# Patient Record
Sex: MALE | Race: BLACK OR AFRICAN AMERICAN | NOT HISPANIC OR LATINO | Employment: FULL TIME | URBAN - METROPOLITAN AREA
[De-identification: names, ages, dates, MRNs, and addresses within clinical notes are randomized per-mention and may not be internally consistent; named-entity substitution may affect disease eponyms.]

---

## 2017-05-11 ENCOUNTER — OFFICE VISIT (OUTPATIENT)
Dept: MEDICAL GROUP | Facility: PHYSICIAN GROUP | Age: 37
End: 2017-05-11
Payer: COMMERCIAL

## 2017-05-11 VITALS
OXYGEN SATURATION: 100 % | HEART RATE: 66 BPM | RESPIRATION RATE: 14 BRPM | HEIGHT: 74 IN | SYSTOLIC BLOOD PRESSURE: 118 MMHG | WEIGHT: 235 LBS | BODY MASS INDEX: 30.16 KG/M2 | TEMPERATURE: 97.3 F | DIASTOLIC BLOOD PRESSURE: 88 MMHG

## 2017-05-11 DIAGNOSIS — R00.2 HEART PALPITATIONS: ICD-10-CM

## 2017-05-11 DIAGNOSIS — F10.10 ALCOHOL ABUSE: ICD-10-CM

## 2017-05-11 DIAGNOSIS — E66.9 OBESITY (BMI 30-39.9): ICD-10-CM

## 2017-05-11 DIAGNOSIS — R53.83 FATIGUE, UNSPECIFIED TYPE: ICD-10-CM

## 2017-05-11 DIAGNOSIS — F43.9 STRESS: ICD-10-CM

## 2017-05-11 PROCEDURE — 99214 OFFICE O/P EST MOD 30 MIN: CPT | Performed by: NURSE PRACTITIONER

## 2017-05-11 ASSESSMENT — PATIENT HEALTH QUESTIONNAIRE - PHQ9: CLINICAL INTERPRETATION OF PHQ2 SCORE: 0

## 2017-05-11 NOTE — MR AVS SNAPSHOT
"        Severino Alva Carrier   2017 8:40 AM   Office Visit   MRN: [id number]    Department:  [facility]   Dept Phone:  [phone]    Description:  Male : 1980   Provider:  ELISE Junior           Reason for Visit     Establish Care     Orders Needed labs       Allergies as of 2017     No Known Allergies      You were diagnosed with     Heart palpitations   [978137]       Obesity (BMI 30-39.9)   [858573]       Stress   [879640]       Fatigue, unspecified type   [4774729]         Vital Signs     Blood Pressure Pulse Temperature Respirations Height Weight    118/88 mmHg 66 36.3 °C (97.3 °F) 14 1.88 m (6' 2\") 106.595 kg (235 lb)    Body Mass Index Oxygen Saturation Smoking Status             30.16 kg/m2 100% Never Smoker          Basic Information     Date Of Birth Sex Race Ethnicity Preferred Language    1980 Male Black or  Non- English      Your appointments     2017  8:00 AM   Established Patient with ELISE Junior   [facility]    [address] [place] [id number] [phone]           You will be receiving a confirmation call a few days before your appointment from our automated call confirmation system.              Problem List              ICD-10-CM Priority Class Noted - Resolved    Heart palpitations R00.2   2017 - Present    Obesity (BMI 30-39.9) E66.9   2017 - Present    Stress F43.9   2017 - Present      Health Maintenance        Date Due Completion Dates    IMM DTaP/Tdap/Td Vaccine (1 - Tdap) 1998 ---            Current Immunizations     No immunizations on file.      Below and/or attached are the medications your provider expects you to take. Review all of your home medications and newly ordered medications with your provider and/or pharmacist. Follow medication instructions as directed by your provider and/or pharmacist. Please keep your medication list with you and share " with your provider. Update the information when medications are discontinued, doses are changed, or new medications (including over-the-counter products) are added; and carry medication information at all times in the event of emergency situations     Allergies:  No Known Allergies          Medications  Valid as of: May 11, 2017 -  9:22 AM    Generic Name Brand Name Tablet Size Instructions for use    Sulfamethoxazole-Trimethoprim (Tab) BACTRIM -160 MG Take 1 Tab by mouth 2 times a day.        .                 Medicines prescribed today were sent to:     [facility] #[id number] - [place], [place] - [address] [place] [address]    Phone: [phone] Fax: [phone]    Open 24 Hours?: No      Medication refill instructions:       If your prescription bottle indicates you have medication refills left, it is not necessary to call your provider’s office. Please contact your pharmacy and they will refill your medication.    If your prescription bottle indicates you do not have any refills left, you may request refills at any time through one of the following ways: The online [employer] system (except Urgent Care), by calling your provider’s office, or by asking your pharmacy to contact your provider’s office with a refill request. Medication refills are processed only during regular business hours and may not be available until the next business day. Your provider may request additional information or to have a follow-up visit with you prior to refilling your medication.   *Please Note: Medication refills are assigned a new Rx number when refilled electronically. Your pharmacy may indicate that no refills were authorized even though a new prescription for the same medication is available at the pharmacy. Please request the medicine by name with the pharmacy before contacting your provider for a refill.        Your To Do List     Future Labs/Procedures Complete By  Expires    CBC WITH DIFFERENTIAL  As directed 5/12/2018    COMP METABOLIC PANEL  As directed 5/12/2018    LIPID PROFILE  As directed 5/12/2018    TESTOSTERONE SERUM  As directed 5/11/2018    TSH  As directed 5/12/2018    VITAMIN D,25 HYDROXY  As directed 5/12/2018      Referral     A referral request has been sent to our patient care coordination department. Please allow 3-5 business days for us to process this request and contact you either by phone or mail. If you do not hear from us by the 5th business day, please call us at [phone].           [employer] Access Code: [email or web address]  Expires: 6/10/2017  9:22 AM    [employer]  A secure, online tool to manage your health information     [employer]’s [employer]® is a secure, online tool that connects you to your personalized health information from the privacy of your home -- day or night - making it very easy for you to manage your healthcare. Once the activation process is completed, you can even access your medical information using the [employer] kayy, which is available for free in the Apple Kayy store or Google Play store.     [employer] provides the following levels of access (as shown below):   My Chart Features   [facility] Doctor [facility]  Specialists [facility]  Urgent  Care Non-[facility]  Primary Care  Doctor   Email your healthcare team securely and privately 24/7 X X X    Manage appointments: schedule your next appointment; view details of past/upcoming appointments X      Request prescription refills. X      View recent personal medical records, including lab and immunizations X X X X   View health record, including health history, allergies, medications X X X X   Read reports about your outpatient visits, procedures, consult and ER notes X X X X   See your discharge summary, which is a recap of your hospital and/or ER visit that includes your diagnosis, lab results, and care plan. X X       How to register for [employer]:  1. Go to   [email or web address].  2. Click on the Sign Up Now box, which takes you to the New Member Sign Up page. You will need to provide the following information:  a. Enter your [employer] Access Code exactly as it appears at the top of this page. (You will not need to use this code after you’ve completed the sign-up process. If you do not sign up before the expiration date, you must request a new code.)   b. Enter your date of birth.   c. Enter your home email address.   d. Click Submit, and follow the next screen’s instructions.  3. Create a [employer] ID. This will be your [employer] login ID and cannot be changed, so think of one that is secure and easy to remember.  4. Create a [employer] password. You can change your password at any time.  5. Enter your Password Reset Question and Answer. This can be used at a later time if you forget your password.   6. Enter your e-mail address. This allows you to receive e-mail notifications when new information is available in [employer].  7. Click Sign Up. You can now view your health information.    For assistance activating your [employer] account, call [phone]

## 2017-05-11 NOTE — ASSESSMENT & PLAN NOTE
Patient states that he has occasionaly palpitations with chest heaviness that started several years ago.  Had cardiac ultrasound that was normal a few months ago.

## 2017-05-11 NOTE — ASSESSMENT & PLAN NOTE
Patient states that he has increased his alcohol intake to almost a half a pint every night. And has not going to the gym as often as he had in the past and so he has gained some weight that he is concerned about.

## 2017-05-11 NOTE — PROGRESS NOTES
"Chief Complaint   Patient presents with   • Establish Care   • Orders Needed     labs        Subjective:   Severino Alva Carrier is a 37 y.o. -American male here today to establish care and for for evaluation and management of:    Heart palpitations  Patient states that he has occasionaly palpitations with chest heaviness that started several years ago.  Had cardiac ultrasound that was normal a few months ago.    Alcohol abuse  Patient states that over the past month his alcohol intake has increased dramatically. He attributes this to stress at home with his marriage. He and his wife had been  9 years and they have 3 daughters together. He states that he is drinking 1/2 pint nightly of vodka. He is not going to the gym as frequently as he had in the past and is concerned. He denies any suicidal ideations at this time.    Obesity (BMI 30-39.9)  Patient states that he has increased his alcohol intake to almost a half a pint every night. And has not going to the gym as often as he had in the past and so he has gained some weight that he is concerned about.    Stress  Patient states that his job is enjoyable and he does not feel job stress however he states that he is having marital stress.         Current medicines (including changes today)  Current Outpatient Prescriptions   Medication Sig Dispense Refill   • sulfamethoxazole-trimethoprim (BACTRIM DS) 800-160 MG tablet Take 1 Tab by mouth 2 times a day. 20 Tab 0     No current facility-administered medications for this visit.     He  has no past medical history on file.    ROS as stated in history of present illness.  No chest pain, no shortness of breath, no abdominal pain       Objective:     Blood pressure 118/88, pulse 66, temperature 36.3 °C (97.3 °F), resp. rate 14, height 1.88 m (6' 2\"), weight 106.595 kg (235 lb), SpO2 100 %. Body mass index is 30.16 kg/(m^2). up 10 pounds from last year.  Physical Exam:  Constitutional: Alert, no " distress.  Skin: Warm, dry, good turgor,no cyanosis, no rashes in visible areas.  Eye: Equal, round and reactive, conjunctiva clear, lids normal.  Ears: No tenderness, no discharge.  External canals are without any significant edema or erythema.  Tympanic membranes are without any inflammation, no effusion.  Gross auditory acuity is intact.  Nose: symmetrical without tenderness, no discharge.  Mouth/Throat: lips without lesion.  Oropharynx clear.  Throat without erythema, exudates or tonsillar enlargement.  Neck: Trachea midline, no masses, no obvious thyroid enlargement.. No cervical or supraclavicular lymphadenopathy. Range of motion within normal limits.  Neuro: Cranial nerves 2-12 grossly intact.  No sensory deficit.  Respiratory: Unlabored respiratory effort, lungs clear to auscultation, no wheezes, no ronchi.  Cardiovascular: Normal S1, S2, no murmur, no edema.  Abdomen: Soft, non-tender, no masses, no guarding,  no hepatosplenomegaly.  Psych: Alert and oriented x3, normal affect and mood and judgement.        Assessment and Plan:   The following treatment plan was discussed    1. Heart palpitations  This is a new problem to me. Chronic. Ongoing. Patient was seen at an urgent care and a cardiac ultrasound was ordered which he obtained this was all normal. Patient did not follow through with any lab work at that time and is requesting to do that at this time. I truly believe that with a normal cardiac ultrasound that his palpitations are stress related. We will monitor and follow results. ED precautions given and patient verbalizes understanding. Patient is to return in 6 weeks for follow-up.  - COMP METABOLIC PANEL; Future  - LIPID PROFILE; Future  - CBC WITH DIFFERENTIAL; Future    2. Obesity (BMI 30-39.9)  This is a new problem to me. Acute. Unstable. Patient's alcohol intake has increased and exercise has decreased. We discussed at length diet and exercise and decreasing alcohol.    3. Stress  This is a new  problem to me. Acute. Unstable. Referral placed to behavioral health. Patient's alcohol intake has increased and he is having marital problems. He should return in 6 weeks for follow-up.  - Patient identified as having weight management issue.  Appropriate orders and counseling given.  - REFERRAL TO BEHAVIORAL HEALTH    4. Fatigue, unspecified type  This is a new problem to me. Chronic. Ongoing. We will check labs to rule out metabolic issues. I feel like this is mostly related to the stresses that he is experiencing along with his alcohol intake however we will check for any metabolic causes. Monitor results. Patient to follow-up in 6 weeks.  - TSH; Future  - VITAMIN D,25 HYDROXY; Future  - TESTOSTERONE SERUM; Future    5. Alcohol abuse  This is a new problem to me. Acute. Unstable. Discussed at length with patient his concerns. Referral to behavioral health placed. Patient is concerned about his intake and wants to decrease this. He is cycle of going to the gym after work before going home to help him relieve stress and decrease alcohol. Monitor and follow. Patient to follow-up in 6 weeks.      Followup: Return in about 2 months (around 7/11/2017) for stress I.

## 2017-05-11 NOTE — ASSESSMENT & PLAN NOTE
Patient states that over the past month his alcohol intake has increased dramatically. He attributes this to stress at home with his marriage. He and his wife had been  9 years and they have 3 daughters together. He states that he is drinking 1/2 pint nightly of vodka. He is not going to the gym as frequently as he had in the past and is concerned. He denies any suicidal ideations at this time.

## 2017-05-11 NOTE — ASSESSMENT & PLAN NOTE
Patient states that his job is enjoyable and he does not feel job stress however he states that he is having marital stress.

## 2017-05-15 ENCOUNTER — HOSPITAL ENCOUNTER (OUTPATIENT)
Dept: LAB | Facility: MEDICAL CENTER | Age: 37
End: 2017-05-15
Attending: NURSE PRACTITIONER
Payer: COMMERCIAL

## 2017-05-15 DIAGNOSIS — R53.82 CHRONIC FATIGUE: ICD-10-CM

## 2017-05-15 DIAGNOSIS — R53.83 FATIGUE, UNSPECIFIED TYPE: ICD-10-CM

## 2017-05-15 DIAGNOSIS — R00.2 HEART PALPITATIONS: ICD-10-CM

## 2017-05-15 LAB
25(OH)D3 SERPL-MCNC: 12 NG/ML (ref 30–100)
ALBUMIN SERPL BCP-MCNC: 4.5 G/DL (ref 3.2–4.9)
ALBUMIN/GLOB SERPL: 1.4 G/DL
ALP SERPL-CCNC: 34 U/L (ref 30–99)
ALT SERPL-CCNC: 66 U/L (ref 2–50)
ANION GAP SERPL CALC-SCNC: 7 MMOL/L (ref 0–11.9)
AST SERPL-CCNC: 53 U/L (ref 12–45)
BASOPHILS # BLD AUTO: 0.5 % (ref 0–1.8)
BASOPHILS # BLD: 0.02 K/UL (ref 0–0.12)
BILIRUB SERPL-MCNC: 0.5 MG/DL (ref 0.1–1.5)
BUN SERPL-MCNC: 16 MG/DL (ref 8–22)
CALCIUM SERPL-MCNC: 9.4 MG/DL (ref 8.5–10.5)
CHLORIDE SERPL-SCNC: 106 MMOL/L (ref 96–112)
CHOLEST SERPL-MCNC: 166 MG/DL (ref 100–199)
CO2 SERPL-SCNC: 25 MMOL/L (ref 20–33)
CREAT SERPL-MCNC: 1.12 MG/DL (ref 0.5–1.4)
EOSINOPHIL # BLD AUTO: 0.03 K/UL (ref 0–0.51)
EOSINOPHIL NFR BLD: 0.8 % (ref 0–6.9)
ERYTHROCYTE [DISTWIDTH] IN BLOOD BY AUTOMATED COUNT: 45.1 FL (ref 35.9–50)
GFR SERPL CREATININE-BSD FRML MDRD: >60 ML/MIN/1.73 M 2
GLOBULIN SER CALC-MCNC: 3.2 G/DL (ref 1.9–3.5)
GLUCOSE SERPL-MCNC: 89 MG/DL (ref 65–99)
HCT VFR BLD AUTO: 46.4 % (ref 42–52)
HDLC SERPL-MCNC: 53 MG/DL
HGB BLD-MCNC: 15.4 G/DL (ref 14–18)
IMM GRANULOCYTES # BLD AUTO: 0 K/UL (ref 0–0.11)
IMM GRANULOCYTES NFR BLD AUTO: 0 % (ref 0–0.9)
LDLC SERPL CALC-MCNC: 99 MG/DL
LYMPHOCYTES # BLD AUTO: 2.11 K/UL (ref 1–4.8)
LYMPHOCYTES NFR BLD: 57.7 % (ref 22–41)
MCH RBC QN AUTO: 30.9 PG (ref 27–33)
MCHC RBC AUTO-ENTMCNC: 33.2 G/DL (ref 33.7–35.3)
MCV RBC AUTO: 93 FL (ref 81.4–97.8)
MONOCYTES # BLD AUTO: 0.32 K/UL (ref 0–0.85)
MONOCYTES NFR BLD AUTO: 8.7 % (ref 0–13.4)
NEUTROPHILS # BLD AUTO: 1.18 K/UL (ref 1.82–7.42)
NEUTROPHILS NFR BLD: 32.3 % (ref 44–72)
NRBC # BLD AUTO: 0 K/UL
NRBC BLD AUTO-RTO: 0 /100 WBC
PLATELET # BLD AUTO: 187 K/UL (ref 164–446)
PMV BLD AUTO: 11.5 FL (ref 9–12.9)
POTASSIUM SERPL-SCNC: 4.3 MMOL/L (ref 3.6–5.5)
PROT SERPL-MCNC: 7.7 G/DL (ref 6–8.2)
RBC # BLD AUTO: 4.99 M/UL (ref 4.7–6.1)
SODIUM SERPL-SCNC: 138 MMOL/L (ref 135–145)
TESTOST SERPL-MCNC: 246 NG/DL (ref 175–781)
TRIGL SERPL-MCNC: 72 MG/DL (ref 0–149)
TSH SERPL DL<=0.005 MIU/L-ACNC: 2.32 UIU/ML (ref 0.3–3.7)
WBC # BLD AUTO: 3.7 K/UL (ref 4.8–10.8)

## 2017-05-15 PROCEDURE — 85025 COMPLETE CBC W/AUTO DIFF WBC: CPT

## 2017-05-15 PROCEDURE — 84443 ASSAY THYROID STIM HORMONE: CPT

## 2017-05-15 PROCEDURE — 82306 VITAMIN D 25 HYDROXY: CPT

## 2017-05-15 PROCEDURE — 84403 ASSAY OF TOTAL TESTOSTERONE: CPT

## 2017-05-15 PROCEDURE — 80053 COMPREHEN METABOLIC PANEL: CPT

## 2017-05-15 PROCEDURE — 80061 LIPID PANEL: CPT

## 2017-05-15 PROCEDURE — 36415 COLL VENOUS BLD VENIPUNCTURE: CPT

## 2017-05-16 ENCOUNTER — TELEPHONE (OUTPATIENT)
Dept: MEDICAL GROUP | Facility: PHYSICIAN GROUP | Age: 37
End: 2017-05-16

## 2017-05-16 NOTE — TELEPHONE ENCOUNTER
----- Message from ELISE Junior sent at 5/15/2017  4:31 PM PDT -----  Please let Prince know that his labs are back.  Kidney, thyroid, cholesterol and triglyceride levels normal.  Testosterone was in the normal range, but as we discussed, we should get a second draw.  I will put in the order and you can come to the lab at your convenience (at least a week apart from the first draw) and we will get a second test.  Your liver enzymes were a little elevated.  This could be from your alcohol usage putting pressure on your liver function.  Your vitamin D level came back low at 12.  The normal level is above 30.  Vitamin D is an important vitamin that helps with energy, bone strength and our immune system.  It also helps our body utilize glucose more efficiently.  I would like you to add 5000 units of vitamin D3 daily.  We will recheck your levels in 12 months. You can get Vitamin D3 over the counter at any drug store.  ELISE Junior

## 2018-05-05 ENCOUNTER — OFFICE VISIT (OUTPATIENT)
Dept: URGENT CARE | Facility: PHYSICIAN GROUP | Age: 38
End: 2018-05-05
Payer: COMMERCIAL

## 2018-05-05 VITALS
HEART RATE: 89 BPM | HEIGHT: 74 IN | RESPIRATION RATE: 14 BRPM | OXYGEN SATURATION: 98 % | DIASTOLIC BLOOD PRESSURE: 80 MMHG | TEMPERATURE: 97.6 F | WEIGHT: 225 LBS | BODY MASS INDEX: 28.88 KG/M2 | SYSTOLIC BLOOD PRESSURE: 120 MMHG

## 2018-05-05 DIAGNOSIS — S86.012A RUPTURE OF LEFT ACHILLES TENDON, INITIAL ENCOUNTER: ICD-10-CM

## 2018-05-05 PROCEDURE — 99214 OFFICE O/P EST MOD 30 MIN: CPT | Performed by: PHYSICIAN ASSISTANT

## 2018-05-05 RX ORDER — TRAMADOL HYDROCHLORIDE 50 MG/1
50 TABLET ORAL EVERY 4 HOURS PRN
Qty: 20 TAB | Refills: 0 | Status: SHIPPED | OUTPATIENT
Start: 2018-05-05 | End: 2018-05-12

## 2018-05-05 ASSESSMENT — ENCOUNTER SYMPTOMS
CHILLS: 0
TINGLING: 0
SHORTNESS OF BREATH: 0
INABILITY TO BEAR WEIGHT: 0
NAUSEA: 0
DIARRHEA: 0
VOMITING: 0
DIZZINESS: 0
NUMBNESS: 1
FEVER: 0
LOSS OF SENSATION: 0
MUSCLE WEAKNESS: 0
ABDOMINAL PAIN: 0

## 2018-05-05 NOTE — PROGRESS NOTES
"Subjective:      Severino Mcfarlane is a 38 y.o. male who presents with Pain (left calf pain since today )            Ankle Injury    The incident occurred 1 to 3 hours ago. The incident occurred at the gym. The pain is present in the right ankle. The quality of the pain is described as aching. The pain is at a severity of 6/10. The pain is moderate. The pain has been constant since onset. Associated symptoms include numbness. Pertinent negatives include no inability to bear weight, loss of sensation, muscle weakness or tingling. The symptoms are aggravated by palpation and movement. He has tried nothing for the symptoms.     Patient presents to urgent care reporting left ankle pain starting today while playing basketball. He reports feeling a \"pop\" in his left achilles with immediate sharp pain. He has had difficulty walking and moving the ankle since the incident. He also reports some numbness over the posterior ankle. No prior left ankle injuries. He has not taken anything for the pain.    Review of Systems   Constitutional: Negative for chills and fever.   HENT: Negative for congestion.    Respiratory: Negative for shortness of breath.    Cardiovascular: Negative for chest pain.   Gastrointestinal: Negative for abdominal pain, diarrhea, nausea and vomiting.   Genitourinary: Negative.    Musculoskeletal:        Positive for ankle pain   Neurological: Positive for numbness. Negative for dizziness and tingling.        Objective:     /80   Pulse 89   Temp 36.4 °C (97.6 °F)   Resp 14   Ht 1.88 m (6' 2\")   Wt 102.1 kg (225 lb)   SpO2 98%   BMI 28.89 kg/m²      Physical Exam   Constitutional: He is oriented to person, place, and time. He appears well-developed and well-nourished. No distress.   HENT:   Head: Normocephalic and atraumatic.   Eyes: Pupils are equal, round, and reactive to light.   Neck: Normal range of motion.   Cardiovascular: Normal rate.    Pulmonary/Chest: Effort normal. "   Musculoskeletal:        Left ankle: He exhibits decreased range of motion. He exhibits no swelling and no ecchymosis. Achilles tendon exhibits pain, defect and abnormal Finney's test results.   Tenderness over left posterior calf.    Neurological: He is alert and oriented to person, place, and time.   Skin: Skin is warm and dry. He is not diaphoretic.   Psychiatric: He has a normal mood and affect. His behavior is normal.   Nursing note and vitals reviewed.         PMH:  has no past medical history on file.  MEDS: No current outpatient prescriptions on file.  ALLERGIES: No Known Allergies  SURGHX:   Past Surgical History:   Procedure Laterality Date   • KNEE ARTHROSCOPY Left      SOCHX:  reports that he has never smoked. He has never used smokeless tobacco. He reports that he drinks about 2.4 oz of alcohol per week . He reports that he does not use drugs.  FH: family history includes Diabetes in his mother; Heart Disease in his mother; No Known Problems in his father and sister.       Assessment/Plan:     1. Rupture of left Achilles tendon, initial encounter  - REFERRAL TO SPORTS MEDICINE    Suspect achilles tendon rupture. Patient placed in posterior leg splint and given crutches. Advised no weight bearing until instructed otherwise. He will follow up with Dr. Gottlieb in 2 days (5/7) for further evaluation and discussion of surgical vs. non-surgical management. Encouraged icing and OTC nsiads as needed for pain. The patient demonstrated a good understanding and agreed with the treatment plan.

## 2018-05-08 ENCOUNTER — OFFICE VISIT (OUTPATIENT)
Dept: MEDICAL GROUP | Facility: CLINIC | Age: 38
End: 2018-05-08
Payer: COMMERCIAL

## 2018-05-08 VITALS
OXYGEN SATURATION: 98 % | RESPIRATION RATE: 18 BRPM | DIASTOLIC BLOOD PRESSURE: 82 MMHG | HEART RATE: 90 BPM | WEIGHT: 225 LBS | HEIGHT: 74 IN | SYSTOLIC BLOOD PRESSURE: 124 MMHG | TEMPERATURE: 98.2 F | BODY MASS INDEX: 28.88 KG/M2

## 2018-05-08 DIAGNOSIS — S86.012A RUPTURE OF LEFT ACHILLES TENDON, INITIAL ENCOUNTER: ICD-10-CM

## 2018-05-08 PROCEDURE — 99203 OFFICE O/P NEW LOW 30 MIN: CPT | Performed by: FAMILY MEDICINE

## 2018-05-08 ASSESSMENT — ENCOUNTER SYMPTOMS
VOMITING: 0
HEADACHES: 0
CHILLS: 0
DIZZINESS: 0
FEVER: 0
PALPITATIONS: 0
SHORTNESS OF BREATH: 0
NAUSEA: 0

## 2018-05-08 NOTE — PROGRESS NOTES
"Subjective:      Severino Mcfarlane is a 38 y.o. male who presents with Leg Pain (Referral from / L calf injury )       Referred by Saundra Bustamante PA-C  for evaluation of LEFT ankle pain    HPI   LEFT ankle pain  Date of injury May 5, 2018  Mechanism of injury, heard a pop or playing basketball  Was able to bear weight shortly after the incident, limping  Mostly pain in the calf region of the LEFT leg  No pain has focused more down towards the posterior heel region  Burning, occasionally sharp at the end of the day  No radiation  Taking ibuprofen for pain which is helping some  POSITIVE swelling  No prior issues with the ankle  Seen at urgent care, wrapped and provided with crutches for nonweightbearing status     Review of Systems   Constitutional: Negative for chills and fever.   Respiratory: Negative for shortness of breath.    Cardiovascular: Negative for chest pain and palpitations.   Gastrointestinal: Negative for nausea and vomiting.   Neurological: Negative for dizziness and headaches.     PMH:  has no past medical history on file.  MEDS:   Current Outpatient Prescriptions:   •  tramadol (ULTRAM) 50 MG Tab, Take 1 Tab by mouth every four hours as needed for up to 7 days., Disp: 20 Tab, Rfl: 0  ALLERGIES: No Known Allergies  SURGHX:   Past Surgical History:   Procedure Laterality Date   • KNEE ARTHROSCOPY Left      SOCHX:  reports that he has never smoked. He has never used smokeless tobacco. He reports that he drinks about 2.4 oz of alcohol per week . He reports that he does not use drugs.  FH: Family history was reviewed, no pertinent findings to report       Objective:     /82   Pulse 90   Temp 36.8 °C (98.2 °F)   Resp 18   Ht 1.88 m (6' 2\")   Wt 102.1 kg (225 lb)   SpO2 98%   BMI 28.89 kg/m²       Physical Exam      RIGHT ANKLE:  There is NO swelling noted at the ankle  Range of motion intact with dorsiflexion and plantarflexion, inversion and eversion  There is NO tenderness of the " ATFL, CF or PT of ligament  There is NO tenderness of the lateral malleolus or medial malleolus  Anterior drawer testing is NEGATIVE  Talar tilt testing is NEGATIVE  The foot and ankle is otherwise neurovascularly intact    RIGHT FOOT:  There is NO swelling noted at the foot  There is NO tenderness at the base of the fifth metatarsal, cuboid, or tarsal navicular  There is NO pain with metatarsal squeeze test    LEFT ANKLE:  There is POSITIVE swelling noted at the ankle  Range of motion LIMITED with plantarflexion, otherwise near normal with dorsiflexion, inversion and eversion  There is NO tenderness of the ATFL, CF or PT of ligament  There is NO tenderness of the lateral malleolus or medial malleolus  Anterior drawer testing is NEGATIVE  Talar tilt testing is NEGATIVE  The foot and ankle is otherwise neurovascularly intact  POSITIVE Finney's squeeze test on  the LEFT side compared to the right    LEFT FOOT:  There is NO swelling noted at the foot  There is NO tenderness at the base of the fifth metatarsal, cuboid, or tarsal navicular  There is NO pain with metatarsal squeeze test    NEUTRAL stance  Able to ambulate with ANTALGIC gait    Assessment/Plan:     1. Rupture of left Achilles tendon, initial encounter       Clinical diagnosis of acute LEFT Achilles tendon rupture while playing basketball  POSITIVE Finney's squeeze test in the office TODAY    We discussed the benefits of operative versus nonoperative treatment  Patient would prefer to proceed with nonoperative treatment to avoid the risk of infection and complications of surgical procedure. He understands that his rupture right is similar with operative or nonoperative treatment. He also understands that typically high level athlete's undergo surgical repair. Patient is a recreational athlete (basketball)    Provided with Cam Walker AND 30° heel wedge  The plan is to remove a level of heel wedge every 2 weeks for a total of 6-8 weeks   (around 6/26/18)    Patient will need formal physical therapy upon completion of immobilization    Return in about 1 week (around 5/15/2018).    Thank you Saundra Bustamante PA-C for allowing me to participate in caring for your patient.

## 2018-05-17 ENCOUNTER — OFFICE VISIT (OUTPATIENT)
Dept: MEDICAL GROUP | Facility: CLINIC | Age: 38
End: 2018-05-17
Payer: COMMERCIAL

## 2018-05-17 VITALS
SYSTOLIC BLOOD PRESSURE: 122 MMHG | WEIGHT: 225 LBS | HEIGHT: 74 IN | BODY MASS INDEX: 28.88 KG/M2 | DIASTOLIC BLOOD PRESSURE: 80 MMHG | RESPIRATION RATE: 16 BRPM | OXYGEN SATURATION: 98 % | HEART RATE: 76 BPM | TEMPERATURE: 97.9 F

## 2018-05-17 DIAGNOSIS — S86.012A RUPTURE OF LEFT ACHILLES TENDON, INITIAL ENCOUNTER: ICD-10-CM

## 2018-05-17 PROCEDURE — 99213 OFFICE O/P EST LOW 20 MIN: CPT | Performed by: FAMILY MEDICINE

## 2018-05-17 ASSESSMENT — ENCOUNTER SYMPTOMS
DIZZINESS: 0
VOMITING: 0
NAUSEA: 0
CHILLS: 0
PALPITATIONS: 0
FEVER: 0
HEADACHES: 0
SHORTNESS OF BREATH: 0

## 2018-05-17 NOTE — PROGRESS NOTES
"Subjective:      Severino Mcfarlane is a 38 y.o. male who presents with Leg Injury (F/V L achilles tendon rupture )       Referred by Saundra Bustamante PA-C  for evaluation of LEFT ankle pain    HPI   LEFT ankle pain  Date of injury May 5, 2018  Mechanism of injury, heard a pop or playing basketball  Was able to bear weight shortly after the incident, limping    NO pain in the calf region in boot  Feeling better  No radiation  MINIMAL swelling  No prior issues with the ankle    Review of Systems   Constitutional: Negative for chills and fever.   Respiratory: Negative for shortness of breath.    Cardiovascular: Negative for chest pain and palpitations.   Gastrointestinal: Negative for nausea and vomiting.   Neurological: Negative for dizziness and headaches.     PMH:  has no past medical history on file.  MEDS: No current outpatient prescriptions on file.  ALLERGIES: No Known Allergies  SURGHX:   Past Surgical History:   Procedure Laterality Date   • KNEE ARTHROSCOPY Left      SOCHX:  reports that he has never smoked. He has never used smokeless tobacco. He reports that he drinks about 2.4 oz of alcohol per week . He reports that he does not use drugs.  FH: Family history was reviewed, no pertinent findings to report       Objective:     /80   Pulse 76   Temp 36.6 °C (97.9 °F)   Resp 16   Ht 1.88 m (6' 2\")   Wt 102.1 kg (225 lb)   SpO2 98%   BMI 28.89 kg/m²      Physical Exam    LEFT ANKLE:  There is MINIMAL swelling noted at the platerior ankle  Range of motion IMPROVED with plantarflexion, otherwise near normal with dorsiflexion, inversion and eversion  There is NO tenderness of the ATFL, CF or PT of ligament  There is NO tenderness of the lateral malleolus or medial malleolus  Anterior drawer testing is NEGATIVE  Talar tilt testing is NEGATIVE  The foot and ankle is otherwise neurovascularly intact  Hardening of tissues at region of original defect    NEUTRAL stance  Able to ambulate with NORMAL " gait in cam walker    Assessment/Plan:     1. Rupture of left Achilles tendon, initial encounter       Clinical diagnosis of acute LEFT Achilles tendon rupture while playing basketball  Healing    He understands that his rupture risk is similar with operative or nonoperative treatment.  Patient is a recreational athlete (basketball)    Provided with Cam Walker AND 30° heel wedge  removed a level of heel wedge TODAY  Follow up in 2 weeks for another wedge removal at that time    Plan on cam walker for total of 8 weeks (around 7/3/18)   Patient will need formal physical therapy upon completion of immobilization    Return in about 2 weeks (around 5/31/2018).    Thank you Saundra Bustamante PA-C for allowing me to participate in caring for your patient.

## 2018-05-31 ENCOUNTER — OFFICE VISIT (OUTPATIENT)
Dept: MEDICAL GROUP | Facility: CLINIC | Age: 38
End: 2018-05-31
Payer: COMMERCIAL

## 2018-05-31 VITALS
BODY MASS INDEX: 28.88 KG/M2 | WEIGHT: 225 LBS | SYSTOLIC BLOOD PRESSURE: 124 MMHG | HEIGHT: 74 IN | HEART RATE: 82 BPM | DIASTOLIC BLOOD PRESSURE: 80 MMHG | TEMPERATURE: 98.2 F | OXYGEN SATURATION: 98 % | RESPIRATION RATE: 18 BRPM

## 2018-05-31 DIAGNOSIS — S86.012A RUPTURE OF LEFT ACHILLES TENDON, INITIAL ENCOUNTER: ICD-10-CM

## 2018-05-31 PROCEDURE — 99213 OFFICE O/P EST LOW 20 MIN: CPT | Performed by: FAMILY MEDICINE

## 2018-05-31 NOTE — PROGRESS NOTES
"Subjective:      Severino Mcfarlane is a 38 y.o. male who presents with Leg Injury (F/V L achilles tendon injury )       Referred by Saundra Bustamante PA-C  for evaluation of LEFT ankle pain    HPI   LEFT ankle pain  Date of injury May 5, 2018  Mechanism of injury, heard a pop or playing basketball  Was able to bear weight shortly after the incident, limping    NO pain in boot  Feeling MUCH better  No radiation  NO swelling  No prior issues with the ankle    PMH:  has no past medical history on file.  MEDS: No current outpatient prescriptions on file.  ALLERGIES: No Known Allergies  SURGHX:   Past Surgical History:   Procedure Laterality Date   • KNEE ARTHROSCOPY Left      SOCHX:  reports that he has never smoked. He has never used smokeless tobacco. He reports that he drinks about 2.4 oz of alcohol per week . He reports that he does not use drugs.  FH: Family history was reviewed, no pertinent findings to report       Objective:     /80   Pulse 82   Temp 36.8 °C (98.2 °F)   Resp 18   Ht 1.88 m (6' 2\")   Wt 102.1 kg (225 lb)   SpO2 98%   BMI 28.89 kg/m²      Physical Exam    LEFT ANKLE:  There is NO swelling noted at the platerior ankle  Range of motion IMPROVED with plantarflexion, otherwise near normal with dorsiflexion, inversion and eversion  Anterior drawer testing is NEGATIVE  Talar tilt testing is NEGATIVE  The foot and ankle is otherwise neurovascularly intact  Hardening of tissues at region of original defect without swelling    NEUTRAL stance  Able to ambulate with NORMAL gait in cam walker    Assessment/Plan:     1. Rupture of left Achilles tendon, initial encounter        Clinical diagnosis of acute LEFT Achilles tendon rupture while playing basketball  Healing    Down to FINAL level of heel wedge TODAY  Follow up in 2 weeks for COMPLETE wedge removal at that time    Plan on cam walker for total of 8 weeks (around 7/3/18)   Patient will need formal physical therapy upon completion of " immobilization    Return in about 2 weeks (around 6/14/2018).    Thank you Saundra Bustamante PA-C for allowing me to participate in caring for your patient.

## 2018-06-14 ENCOUNTER — OFFICE VISIT (OUTPATIENT)
Dept: MEDICAL GROUP | Facility: CLINIC | Age: 38
End: 2018-06-14
Payer: COMMERCIAL

## 2018-06-14 VITALS
HEIGHT: 74 IN | HEART RATE: 78 BPM | SYSTOLIC BLOOD PRESSURE: 124 MMHG | WEIGHT: 225 LBS | BODY MASS INDEX: 28.88 KG/M2 | DIASTOLIC BLOOD PRESSURE: 80 MMHG | OXYGEN SATURATION: 98 % | RESPIRATION RATE: 16 BRPM | TEMPERATURE: 98.1 F

## 2018-06-14 DIAGNOSIS — S86.012A RUPTURE OF LEFT ACHILLES TENDON, INITIAL ENCOUNTER: ICD-10-CM

## 2018-06-14 PROCEDURE — 99213 OFFICE O/P EST LOW 20 MIN: CPT | Performed by: FAMILY MEDICINE

## 2018-06-21 ENCOUNTER — OFFICE VISIT (OUTPATIENT)
Dept: MEDICAL GROUP | Facility: MEDICAL CENTER | Age: 38
End: 2018-06-21
Payer: COMMERCIAL

## 2018-06-21 VITALS
SYSTOLIC BLOOD PRESSURE: 132 MMHG | TEMPERATURE: 99 F | BODY MASS INDEX: 29.9 KG/M2 | HEIGHT: 74 IN | OXYGEN SATURATION: 97 % | WEIGHT: 233 LBS | DIASTOLIC BLOOD PRESSURE: 90 MMHG | HEART RATE: 68 BPM

## 2018-06-21 DIAGNOSIS — Z13.220 ENCOUNTER FOR LIPID SCREENING FOR CARDIOVASCULAR DISEASE: ICD-10-CM

## 2018-06-21 DIAGNOSIS — Z00.00 ANNUAL PHYSICAL EXAM: ICD-10-CM

## 2018-06-21 DIAGNOSIS — Z13.6 ENCOUNTER FOR LIPID SCREENING FOR CARDIOVASCULAR DISEASE: ICD-10-CM

## 2018-06-21 DIAGNOSIS — L73.9 FOLLICULAR DISORDER: ICD-10-CM

## 2018-06-21 DIAGNOSIS — Z13.1 DIABETES MELLITUS SCREENING: ICD-10-CM

## 2018-06-21 DIAGNOSIS — Z76.89 ENCOUNTER TO ESTABLISH CARE WITH NEW DOCTOR: ICD-10-CM

## 2018-06-21 DIAGNOSIS — S86.012D RUPTURE OF LEFT ACHILLES TENDON, SUBSEQUENT ENCOUNTER: ICD-10-CM

## 2018-06-21 DIAGNOSIS — E66.9 OBESITY (BMI 30-39.9): ICD-10-CM

## 2018-06-21 PROBLEM — F10.10 ALCOHOL ABUSE: Status: RESOLVED | Noted: 2017-05-11 | Resolved: 2018-06-21

## 2018-06-21 PROBLEM — F43.9 STRESS: Status: RESOLVED | Noted: 2017-05-11 | Resolved: 2018-06-21

## 2018-06-21 PROBLEM — R00.2 HEART PALPITATIONS: Status: RESOLVED | Noted: 2017-05-11 | Resolved: 2018-06-21

## 2018-06-21 PROCEDURE — 99214 OFFICE O/P EST MOD 30 MIN: CPT | Performed by: FAMILY MEDICINE

## 2018-06-21 ASSESSMENT — PATIENT HEALTH QUESTIONNAIRE - PHQ9: CLINICAL INTERPRETATION OF PHQ2 SCORE: 0

## 2018-06-24 PROBLEM — S86.012A RUPTURE OF LEFT ACHILLES TENDON: Status: ACTIVE | Noted: 2018-06-24

## 2018-06-25 NOTE — PROGRESS NOTES
Subjective:   Chief Complaint/History of Present Illness:  Severino Mcfarlane is a 38 y.o. male established patient who presents today to establish primary medical care with me, also to discuss medical problems as listed below    Diagnoses of Encounter to establish care with new doctor, Follicular disorder, Obesity (BMI 30-39.9), Annual physical exam, Diabetes mellitus screening, Encounter for lipid screening for cardiovascular disease, and Rupture of left Achilles tendon, subsequent encounter were pertinent to this visit.    Follicular disorder  Patient describes having pronounced hyperpigmentation of follicles on extensor surfaces of bilateral forearms, as well as upper anterior thighs.  These can be pruritic, but are not due to chemical or other allergenic exposure.    ROS is NEGATIVE for skin thickening/erythema, purulent drainage    Obesity (BMI 30-39.9)  Chronic, uncontrolled, worsening.    ROS is NEGATIVE for: cold or heat intolerance, anxiety/depression, chest pain/pressure, palpitations, hair thickening/coarsening/falling out/thinning, skin changes, diarrhea/constipation, unexpected weight change.    ROS is NEGATIVE for blurred vision, polydipsia, polyuria, diaphoresis, palpitations, fatigue, irritability, flank pain, BLE paresthesias.    Rupture of left Achilles tendon  Chronic, uncontrolled, however improving.  Patient continues under the care of his sports medicine doctor, Dr. Edi Gottlieb.  Left lower extremity is in a surgical boot with no apparent elevation of heel.      Patient Active Problem List    Diagnosis Date Noted   • Rupture of left Achilles tendon 06/24/2018   • Follicular disorder 06/21/2018   • Obesity (BMI 30-39.9) 05/11/2017       Additional History:   Allergies:    Patient has no known allergies.     Current Medications:     No current outpatient prescriptions on file.     No current facility-administered medications for this visit.         Social History:     Social History  "  Substance Use Topics   • Smoking status: Never Smoker   • Smokeless tobacco: Never Used   • Alcohol use 4.2 oz/week     7 Cans of beer per week       ROS:     - NOTE: All other systems reviewed and are negative, except as in HPI.     Objective:   Physical Exam:    Vitals: Blood pressure 132/90, pulse 68, temperature 37.2 °C (99 °F), height 1.88 m (6' 2\"), weight 105.7 kg (233 lb), SpO2 97 %.   BMI: Body mass index is 29.92 kg/m².   General/Constitutional: Vitals as above, Well nourished, well developed male in no acute distress   Head/Eyes: Head is grossly normal & atraumatic, bilateral conjunctivae clear and not injected, bilateral EOMI, bilateral PERR   ENT: Bilateral external ears grossly normal in appearance, Hearing grossly intact, External nares normal in appearance and without discharge/bleeding   Respiratory: No respiratory distress, bilateral lungs are clear to ausculation in all lung fields (anterior/lateral/posterior), no wheezing/rhonchi/rales   Cardiovascular: Regular rate and rhythm without murmur/gallops/rubs, distal pulses are intact and equal bilaterally (radial, posterior tibial), no bilateral lower extremity edema   MSK: Gait grossly normal & not antalgic   Integumentary: Erythematous punctate rash that is patchy over bilateral forearms, no apparent rashes   Psych: Judgment grossly appropriate, no apparent depression/anxiety    Health Maintenance:     -Patient's check home records for tetanus shot record     Imaging/Labs:     - 05/2017 --lipid profile well-controlled, CBC with mild leukopenia with increased throughout percentage and decrease neutrophil percentage.    Assessment and Plan:   1. Encounter to establish care with new doctor  Patient assumption prior medical care with me    2. Follicular disorder  New problem, uncontrolled, referral to Dermatology for further evaluation & management.   - REFERRAL TO DERMATOLOGY    3. Obesity (BMI 30-39.9)  Chronic, uncontrolled.  Patient advised to " pursue lifestyle changes.    4. Rupture of left Achilles tendon, subsequent encounter  Chronic, uncontrolled, improving.  Patient to continue care with Dr. Edi Gottlieb.    5. Annual physical exam  6. Diabetes mellitus screening  7. Encounter for lipid screening for cardiovascular disease  Unknown control of metabolic health. Labs as below to evaluate.   - HEMOGLOBIN A1C; Future   - COMP METABOLIC PANEL; Future   - LIPID PROFILE; Future      RTC: in 3months for annual medical exam.    PLEASE NOTE: This dictation was created using voice recognition software. I have made every reasonable attempt to correct obvious errors, but I expect that there are errors of grammar and possibly content that I did not discover before finalizing the note.

## 2018-06-25 NOTE — ASSESSMENT & PLAN NOTE
Chronic, uncontrolled, however improving.  Patient continues under the care of his sports medicine doctor, Dr. Edi Gottlieb.  Left lower extremity is in a surgical boot with no apparent elevation of heel.

## 2018-06-25 NOTE — ASSESSMENT & PLAN NOTE
Patient describes having pronounced hyperpigmentation of follicles on extensor surfaces of bilateral forearms, as well as upper anterior thighs.  These can be pruritic, but are not due to chemical or other allergenic exposure.    ROS is NEGATIVE for skin thickening/erythema, purulent drainage

## 2018-06-25 NOTE — ASSESSMENT & PLAN NOTE
Chronic, uncontrolled, worsening.    ROS is NEGATIVE for: cold or heat intolerance, anxiety/depression, chest pain/pressure, palpitations, hair thickening/coarsening/falling out/thinning, skin changes, diarrhea/constipation, unexpected weight change.    ROS is NEGATIVE for blurred vision, polydipsia, polyuria, diaphoresis, palpitations, fatigue, irritability, flank pain, BLE paresthesias.

## 2018-06-28 ENCOUNTER — OFFICE VISIT (OUTPATIENT)
Dept: MEDICAL GROUP | Facility: CLINIC | Age: 38
End: 2018-06-28
Payer: COMMERCIAL

## 2018-06-28 VITALS
RESPIRATION RATE: 16 BRPM | SYSTOLIC BLOOD PRESSURE: 116 MMHG | BODY MASS INDEX: 29.9 KG/M2 | HEIGHT: 74 IN | HEART RATE: 74 BPM | DIASTOLIC BLOOD PRESSURE: 80 MMHG | OXYGEN SATURATION: 99 % | WEIGHT: 233 LBS | TEMPERATURE: 97.5 F

## 2018-06-28 DIAGNOSIS — S86.012A RUPTURE OF LEFT ACHILLES TENDON, INITIAL ENCOUNTER: ICD-10-CM

## 2018-06-28 PROCEDURE — 99212 OFFICE O/P EST SF 10 MIN: CPT | Performed by: FAMILY MEDICINE

## 2018-06-28 NOTE — PROGRESS NOTES
"Subjective:      Severino Mcfarlane is a 38 y.o. male who presents with Follow-Up (follow up for Lt. foot)       Referred by Saundra Bustamante PA-C  for evaluation of LEFT ankle pain    HPI   FOLLOW up LEFT ankle achilles RUPTURE   Date of injury May 5, 2018  Mechanism of injury, heard a pop or playing basketball  Was able to bear weight shortly after the incident, limping    NO pain in boot    PMH:  has no past medical history on file.  MEDS: No current outpatient prescriptions on file.  ALLERGIES: No Known Allergies  SURGHX:   Past Surgical History:   Procedure Laterality Date   • KNEE ARTHROSCOPY Left     Meniscus repair     SOCHX:  reports that he has never smoked. He has never used smokeless tobacco. He reports that he drinks about 4.2 oz of alcohol per week . He reports that he does not use drugs.  FH: Family history was reviewed, no pertinent findings to report       Objective:     /80   Pulse 74   Temp 36.4 °C (97.5 °F)   Resp 16   Ht 1.88 m (6' 2\")   Wt 105.7 kg (233 lb)   SpO2 99%   BMI 29.92 kg/m²      Physical Exam    LEFT ANKLE:  There is NO swelling noted at the platerior ankle  Excellent scar tissue formation around the Achilles rupture region    NEUTRAL stance  Able to ambulate with NORMAL gait in cam walker    Assessment/Plan:     1. Rupture of left Achilles tendon, subsequent  REFERRAL TO PHYSICAL THERAPY Reason for Therapy: Eval/Treat/Report      Clinical diagnosis of acute LEFT Achilles tendon rupture while playing basketball  Healing    Patient has been in a cam walker boot for a total of 8 weeks with gradual decline in heel wedge height    Recommend weaning boot this week, he can stop wearing the boot while at home, walking with caution    No Follow-up on file.  Follow up in 3 weeks   To see how he is doing with formal physical therapy    Thank you Saundra Bustamante PA-C for allowing me to participate in caring for your patient.  "

## 2018-07-04 ENCOUNTER — SUPERVISING PHYSICIAN REVIEW (OUTPATIENT)
Dept: URGENT CARE | Facility: PHYSICIAN GROUP | Age: 38
End: 2018-07-04

## 2018-07-06 ENCOUNTER — APPOINTMENT (OUTPATIENT)
Dept: PHYSICAL THERAPY | Facility: REHABILITATION | Age: 38
End: 2018-07-06
Attending: FAMILY MEDICINE
Payer: COMMERCIAL

## 2018-09-21 ENCOUNTER — APPOINTMENT (OUTPATIENT)
Dept: MEDICAL GROUP | Facility: MEDICAL CENTER | Age: 38
End: 2018-09-21
Payer: COMMERCIAL

## 2018-10-05 ENCOUNTER — APPOINTMENT (OUTPATIENT)
Dept: MEDICAL GROUP | Facility: MEDICAL CENTER | Age: 38
End: 2018-10-05
Payer: COMMERCIAL

## 2019-02-13 ENCOUNTER — OFFICE VISIT (OUTPATIENT)
Dept: MEDICAL GROUP | Facility: MEDICAL CENTER | Age: 39
End: 2019-02-13
Payer: COMMERCIAL

## 2019-02-13 VITALS
HEIGHT: 74 IN | OXYGEN SATURATION: 99 % | TEMPERATURE: 98 F | SYSTOLIC BLOOD PRESSURE: 118 MMHG | HEART RATE: 84 BPM | WEIGHT: 234 LBS | BODY MASS INDEX: 30.03 KG/M2 | DIASTOLIC BLOOD PRESSURE: 64 MMHG

## 2019-02-13 DIAGNOSIS — S86.012S RUPTURE OF LEFT ACHILLES TENDON, SEQUELA: ICD-10-CM

## 2019-02-13 DIAGNOSIS — L98.9 FINGER LESION: ICD-10-CM

## 2019-02-13 PROCEDURE — 99214 OFFICE O/P EST MOD 30 MIN: CPT | Performed by: FAMILY MEDICINE

## 2019-02-13 ASSESSMENT — PATIENT HEALTH QUESTIONNAIRE - PHQ9: CLINICAL INTERPRETATION OF PHQ2 SCORE: 0

## 2019-02-14 NOTE — ASSESSMENT & PLAN NOTE
"This is a chronic problem, uncontrolled, patient returns for reexamination of left Achilles tendon.  Of note, patient ruptured this earlier last year in June 2018.  At that time, patient was playing basketball.  Currently, patient feels that his left calf muscle and Achilles tendon is weaker than the right.  He is wondering if he needs to have any further evaluation or management.    We did discuss referral to sports medicine doctor has a frequent first step, and he has seen Dr. Edi Gottlieb in the past.  Therefore, we will refer him back to Dr. Gottlieb     ROS is POSITIVE for \"wood foot\" (less strength/ability to dorsi- and plantatflexion), otherwise is  NEGATIVE for gait instability, foot drop.  "

## 2019-02-14 NOTE — ASSESSMENT & PLAN NOTE
This is a new problem for medical evaluation, uncontrolled, patient has a small 2 hyperkeratotic lesion at the distal interphalangeal crease of his third finger on the left hand.  Patient states that this can start off as a small lesion less than 1 mm in diameter, and he can pick at it, it can grow bigger in size and never has had any drainage out of it.    At this time, we discussed differential diagnosis could include Swathi verrucous wart, molluscum contagiosum, abscess, or keloid.  Patient does not keloid otherwise, and this lesion has not spread to other areas of his body.  Therefore, molluscum and keloid are lower on the diagnosis list.    We will do a short trial of cryotherapy, however if this seems to be non-therapeutic, patient is advised to try lancing the lesion.

## 2019-02-19 NOTE — PROGRESS NOTES
"Subjective:   Chief Complaint/History of Present Illness:  Severino Mcfarlane is a 38 y.o. male established patient who presents today to discuss medical problems as listed below    Diagnoses of Rupture of left Achilles tendon, sequela and Finger lesion were pertinent to this visit.    Rupture of left Achilles tendon  This is a chronic problem, uncontrolled, patient returns for reexamination of left Achilles tendon.  Of note, patient ruptured this earlier last year in June 2018.  At that time, patient was playing basketball.  Currently, patient feels that his left calf muscle and Achilles tendon is weaker than the right.  He is wondering if he needs to have any further evaluation or management.    We did discuss referral to sports medicine doctor has a frequent first step, and he has seen Dr. Edi Gottlieb in the past.  Therefore, we will refer him back to Dr. Gottlieb     ROS is POSITIVE for \"wood foot\" (less strength/ability to dorsi- and plantatflexion), otherwise is  NEGATIVE for gait instability, foot drop.    Finger lesion  This is a new problem for medical evaluation, uncontrolled, patient has a small 2 hyperkeratotic lesion at the distal interphalangeal crease of his third finger on the left hand.  Patient states that this can start off as a small lesion less than 1 mm in diameter, and he can pick at it, it can grow bigger in size and never has had any drainage out of it.    At this time, we discussed differential diagnosis could include Swathi verrucous wart, molluscum contagiosum, abscess, or keloid.  Patient does not keloid otherwise, and this lesion has not spread to other areas of his body.  Therefore, molluscum and keloid are lower on the diagnosis list.    We will do a short trial of cryotherapy, however if this seems to be non-therapeutic, patient is advised to try lancing the lesion.      Patient Active Problem List    Diagnosis Date Noted   • Finger lesion 02/13/2019   • Rupture of left " "Achilles tendon 06/24/2018   • Follicular disorder 06/21/2018   • Obesity (BMI 30-39.9) 05/11/2017       Additional History:   Allergies:    Patient has no known allergies.     Current Medications:     No current outpatient prescriptions on file.     No current facility-administered medications for this visit.         Social History:     Social History   Substance Use Topics   • Smoking status: Never Smoker   • Smokeless tobacco: Never Used   • Alcohol use 4.2 oz/week     7 Cans of beer per week       ROS:     - NOTE: All other systems reviewed and are negative, except as in HPI.     Objective:   Physical Exam:    Vitals: Blood pressure 118/64, pulse 84, temperature 36.7 °C (98 °F), temperature source Temporal, height 1.88 m (6' 2\"), weight 106.1 kg (234 lb), SpO2 99 %.   BMI: Body mass index is 30.04 kg/m².   General/Constitutional: Vitals as above, Well nourished, well developed male in no acute distress   Head/Eyes: Head is grossly normal & atraumatic, bilateral conjunctivae clear and not injected, bilateral EOMI, bilateral PERRL   ENT: Bilateral external ears grossly normal in appearance, Hearing grossly intact, External nares normal in appearance and without discharge/bleeding   Respiratory: No respiratory distress, bilateral lungs are clear to ausculation in all lung fields (anterior/lateral/posterior), no wheezing/rhonchi/rales   Cardiovascular: Regular rate and rhythm without murmur/gallops/rubs, distal pulses are intact and equal bilaterally (radial, posterior tibial), no bilateral lower extremity edema   MSK: Left lower extremity with muscular tenderness to palpation at the inferior portion of lower calf muscles, with intact deep tendon reflexes of bilateral Achilles tendons that are equal and 2+, and gait mildly antalgic (favoring left lower extremity)   Integumentary: Patient with small mild hyperkeratotic lesion with umbilication at the distal interphalangeal joint of the second digit on left hand, " otherwise no apparent rashes   Psych: Judgment grossly appropriate, no apparent depression/anxiety    We did attempt a few rounds of cryotherapy to his second digit to his to see if this would respond as expected for a verrucous wart, however lesion responded as would be expected of normal skin.  Therefore, while this could be a nascent verrucous wart, I am deferring repetitive treatments to empirically treat this as a verrucous wart.    Health Maintenance:     - Patient has not received influenza vaccine this season, declines at present time    Imaging/Labs:     -No new labs to review, previous lab orders reprinted, patient advised to get them done at his convenience before annual medical exam    Assessment and Plan:   1. Rupture of left Achilles tendon, sequela  Chronic condition, uncontrolled. However, patient will be urgently referred to sports medicine for further evaluation and management.   - REFERRAL TO SPORTS MEDICINE    2. Finger lesion  New problem, uncontrolled, did not respond as expected for verrucous wart to cryotherapy, therefore this is likely normal, healing skin.  Patient advised to present to clinic once more if necessary.        RTC: In 1 month for annual medical exam.    PLEASE NOTE: This dictation was created using voice recognition software. I have made every reasonable attempt to correct obvious errors, but I expect that there are errors of grammar and possibly content that I did not discover before finalizing the note.

## 2019-03-13 ENCOUNTER — APPOINTMENT (OUTPATIENT)
Dept: MEDICAL GROUP | Facility: MEDICAL CENTER | Age: 39
End: 2019-03-13
Payer: COMMERCIAL

## 2019-04-15 ENCOUNTER — HOSPITAL ENCOUNTER (OUTPATIENT)
Dept: LAB | Facility: MEDICAL CENTER | Age: 39
End: 2019-04-15
Attending: FAMILY MEDICINE
Payer: COMMERCIAL

## 2019-04-15 DIAGNOSIS — Z13.6 ENCOUNTER FOR LIPID SCREENING FOR CARDIOVASCULAR DISEASE: ICD-10-CM

## 2019-04-15 DIAGNOSIS — Z13.1 DIABETES MELLITUS SCREENING: ICD-10-CM

## 2019-04-15 DIAGNOSIS — Z00.00 ANNUAL PHYSICAL EXAM: ICD-10-CM

## 2019-04-15 DIAGNOSIS — Z13.220 ENCOUNTER FOR LIPID SCREENING FOR CARDIOVASCULAR DISEASE: ICD-10-CM

## 2019-04-15 LAB
ALBUMIN SERPL BCP-MCNC: 4.1 G/DL (ref 3.2–4.9)
ALBUMIN/GLOB SERPL: 1.4 G/DL
ALP SERPL-CCNC: 32 U/L (ref 30–99)
ALT SERPL-CCNC: 87 U/L (ref 2–50)
ANION GAP SERPL CALC-SCNC: 6 MMOL/L (ref 0–11.9)
AST SERPL-CCNC: 40 U/L (ref 12–45)
BILIRUB SERPL-MCNC: 0.3 MG/DL (ref 0.1–1.5)
BUN SERPL-MCNC: 16 MG/DL (ref 8–22)
CALCIUM SERPL-MCNC: 9.2 MG/DL (ref 8.5–10.5)
CHLORIDE SERPL-SCNC: 106 MMOL/L (ref 96–112)
CHOLEST SERPL-MCNC: 173 MG/DL (ref 100–199)
CO2 SERPL-SCNC: 26 MMOL/L (ref 20–33)
CREAT SERPL-MCNC: 1.17 MG/DL (ref 0.5–1.4)
EST. AVERAGE GLUCOSE BLD GHB EST-MCNC: 123 MG/DL
FASTING STATUS PATIENT QL REPORTED: NORMAL
GLOBULIN SER CALC-MCNC: 2.9 G/DL (ref 1.9–3.5)
GLUCOSE SERPL-MCNC: 94 MG/DL (ref 65–99)
HBA1C MFR BLD: 5.9 % (ref 0–5.6)
HDLC SERPL-MCNC: 59 MG/DL
LDLC SERPL CALC-MCNC: 95 MG/DL
POTASSIUM SERPL-SCNC: 4 MMOL/L (ref 3.6–5.5)
PROT SERPL-MCNC: 7 G/DL (ref 6–8.2)
SODIUM SERPL-SCNC: 138 MMOL/L (ref 135–145)
TRIGL SERPL-MCNC: 96 MG/DL (ref 0–149)

## 2019-04-15 PROCEDURE — 80061 LIPID PANEL: CPT

## 2019-04-15 PROCEDURE — 83036 HEMOGLOBIN GLYCOSYLATED A1C: CPT

## 2019-04-15 PROCEDURE — 80053 COMPREHEN METABOLIC PANEL: CPT

## 2019-04-15 PROCEDURE — 36415 COLL VENOUS BLD VENIPUNCTURE: CPT

## 2019-04-26 ENCOUNTER — OFFICE VISIT (OUTPATIENT)
Dept: MEDICAL GROUP | Facility: MEDICAL CENTER | Age: 39
End: 2019-04-26
Payer: COMMERCIAL

## 2019-04-26 VITALS
HEIGHT: 74 IN | BODY MASS INDEX: 29.62 KG/M2 | TEMPERATURE: 97.9 F | SYSTOLIC BLOOD PRESSURE: 120 MMHG | DIASTOLIC BLOOD PRESSURE: 80 MMHG | HEART RATE: 78 BPM | OXYGEN SATURATION: 97 % | WEIGHT: 230.8 LBS

## 2019-04-26 DIAGNOSIS — R53.83 OTHER FATIGUE: ICD-10-CM

## 2019-04-26 DIAGNOSIS — Z12.5 PROSTATE CANCER SCREENING: ICD-10-CM

## 2019-04-26 DIAGNOSIS — R73.03 PREDIABETES: ICD-10-CM

## 2019-04-26 DIAGNOSIS — Z00.00 ANNUAL PHYSICAL EXAM: Primary | ICD-10-CM

## 2019-04-26 DIAGNOSIS — S86.012S RUPTURE OF LEFT ACHILLES TENDON, SEQUELA: ICD-10-CM

## 2019-04-26 DIAGNOSIS — E66.3 OVERWEIGHT (BMI 25.0-29.9): ICD-10-CM

## 2019-04-26 DIAGNOSIS — Z23 NEED FOR VACCINATION: ICD-10-CM

## 2019-04-26 PROCEDURE — 90471 IMMUNIZATION ADMIN: CPT | Performed by: FAMILY MEDICINE

## 2019-04-26 PROCEDURE — 90715 TDAP VACCINE 7 YRS/> IM: CPT | Performed by: FAMILY MEDICINE

## 2019-04-26 PROCEDURE — 99396 PREV VISIT EST AGE 40-64: CPT | Mod: 25 | Performed by: FAMILY MEDICINE

## 2019-04-26 PROCEDURE — 99214 OFFICE O/P EST MOD 30 MIN: CPT | Mod: 25 | Performed by: FAMILY MEDICINE

## 2019-04-26 NOTE — PROGRESS NOTES
Subjective:   Chief Complaint/History of Present Illness:  Severino Mcfarlane is a 39 y.o. male established who presents today for Annual Medical exam, to review the following medical problems, and to discuss fatigue (and possible low testosterone).      The primary encounter diagnosis was Annual physical exam. Diagnoses of Prediabetes, Overweight (BMI 25.0-29.9), Other fatigue, Rupture of left Achilles tendon, sequela, Prostate cancer screening, and Need for vaccination were also pertinent to this visit.    PMH, PSH, Social History, Medications, Allergies, FMH all reviewed as documented:    Prediabetes  We discussed that patient is diagnosed with prediabetes, uncontrolled, new diagnosis given that the A1c is:   Lab Results   Component Value Date/Time    HBA1C 5.9 (H) 04/15/2019 06:06 AM        Patient is currently taking (no medication) for glycemic control.    We discussed that prediabetes/diabetes mellitus type 2 are medical conditions of lifestyle habits (less than optimal dietary choices, insufficient cardiovascular exercise), and that they can be controlled (in part or in whole) by these lifestyle changes.     Furthermore, we discussed that at present time it is better to pursue lifestyle changes rather than starting medications. Patient is in agreement.     ROS is NEGATIVE for blurred vision, polydipsia, polyuria, diaphoresis, palpitations, fatigue, irritability, flank pain, BLE paresthesias.    Overweight (BMI 25.0-29.9)  Chronic, uncontrolled, patient advised to pursue lifestyle changes, particularly cardiovascular exercise and increasing proportion of plant-based nutrition.    Other fatigue  New diagnosis, uncontrolled, no obvious signs/symptoms of anemia.  Patient is concerned that he may be having low testosterone, and that this is the reason why he is unable to lose weight and also has low energy.  Therefore, we discussed the risks, benefits, alternatives to using testosterone supplementation, and  patient would like to proceed with testing for testosterone and associated labs.    ROS is NEGATIVE for generalized pallor, dizziness, lightheadedness, blurred vision, chest pain/pressure, palpitations, tachycardia, dyspnea, hematemesis, hemoptysis, diarrhea, hematochezia, melena, hematuria, hand and foot tingling.    Patient verbalizes understanding that taking testosterone can increase risk of CV disease, CVA, prostate cancer, male pattern baldness, shrinkage of testicles, breast enlargement, increased risk of blood clots, increased mood swings and/or aggression, decreased sperm count, infertility, polycythemia.  Patient is willing to assume risks.    Rupture of left Achilles tendon  Chronic, uncontrolled, patient to follow-up with Dr. Edi Gottlieb.      Patient Active Problem List    Diagnosis Date Noted   • Prediabetes 2019   • Other fatigue 2019   • Finger lesion 2019   • Rupture of left Achilles tendon 2018   • Follicular disorder 2018   • Overweight (BMI 25.0-29.9) 2017       Additional History:   Allergies:    Patient has no known allergies.     Medications:     No current [facility]-ordered outpatient prescriptions on file.     No current [facility]-ordered facility-administered medications on file.         Past Medical History:   No past medical history on file.     Past Surgical History:     Past Surgical History:   Procedure Laterality Date   • KNEE ARTHROSCOPY Left     Meniscus repair        Social History:     Social History   Substance Use Topics   • Smoking status: Never Smoker   • Smokeless tobacco: Never Used   • Alcohol use 4.2 oz/week     7 Cans of beer per week        Family History:     Family Status   Relation Status   • Mo         MI   • Sis Alive   • Fa (Not Specified)   • MGMo    • MGFa Alive   • Sis Alive   • Neg Hx (Not Specified)        Family History   Problem Relation Age of Onset   • Heart Disease Mother         MI age 53   • Diabetes Mother   "  • GI Mother         Ulcers   • No Known Problems Sister    • No Known Problems Sister    • Cancer Neg Hx        ROS:     - NOTE: All other systems reviewed and are negative, except as in HPI.     Objective:   Physical Exam:    Vitals: /80   Pulse 78   Temp 36.6 °C (97.9 °F)   Ht 1.88 m (6' 2.02\")   Wt 104.7 kg (230 lb 12.8 oz)   SpO2 97%    BMI: Body mass index is 29.62 kg/m².   General/Constitutional: Vitals as above, Well nourished, well developed male in no acute distress   Head/Eyes: Head is grossly normal & atraumatic, bilateral conjunctivae clear and not injected, bilateral EOMI, bilateral PERRL   ENT: Bilateral external ears grossly normal in appearance, Hearing grossly intact, External nares normal in appearance and without discharge/bleeding, bilateral tympanic membranes with appropriate cone of light reflex   Respiratory: No respiratory distress, bilateral lungs are clear to ausculation in all lung fields (anterior/lateral/posterior), no wheezing/rhonchi/rales   Cardiovascular: Regular rate and rhythm without murmur/gallops/rubs, distal pulses are intact and equal bilaterally (radial, posterior tibial), no bilateral lower extremity edema   MSK: No paraspinal muscular tenderness to palpation of entire back, No pain on gentle percussion of spinous processes of entire back, Gait grossly normal & not antalgic   Integumentary: No apparent rashes   Psych: Judgment grossly appropriate, no apparent depression/anxiety    Health Maintenance:     - Tdap ordered today    Imaging/Labs:     - 04/15/19 -- Prediabetes, mild ALT elevation, labs o/w WNL (CMP, Lipids)     Assessment and Plan:   1. Annual physical exam   - TESTOSTERONE F&T MALE ADULT; Future   - TSH WITH REFLEX TO FT4; Future   - CBC WITH DIFFERENTIAL; Future   - PROSTATE SPECIFIC AG SCREENING; Future    2. Prediabetes  New diagnosis, uncontrolled, patient advised to pursue lifestyle changes, particularly cardiovascular exercise and increasing " proportion of plant-based nutrition.    3. Overweight (BMI 25.0-29.9)  Chronic, uncontrolled, patient advised to pursue lifestyle changes, particularly cardiovascular exercise and increasing proportion of plant-based nutrition.    4. Other fatigue  New problem, uncontrolled, evaluate with labs as below.  Will meet with patient in 1month for follow-up on testosterone.   - TESTOSTERONE F&T MALE ADULT; Future   - TSH WITH REFLEX TO FT4; Future   - CBC WITH DIFFERENTIAL; Future    5. Rupture of left Achilles tendon, sequela  Chronic, uncontrolled, patient to follow-up with Dr. Edi Gottlieb.    6. Prostate cancer screening   - PROSTATE SPECIFIC AG SCREENING; Future    7. Need for vaccination   - Tdap Vaccine =>6YO IM      RTC: in 1month for fatigue follow-up, testosterone labs/management.    PLEASE NOTE: This dictation was created using voice recognition software. I have made every reasonable attempt to correct obvious errors, but I expect that there are errors of grammar and possibly content that I did not discover before finalizing the note.

## 2019-04-26 NOTE — ASSESSMENT & PLAN NOTE
New diagnosis, uncontrolled, no obvious signs/symptoms of anemia.  Patient is concerned that he may be having low testosterone, and that this is the reason why he is unable to lose weight and also has low energy.  Therefore, we discussed the risks, benefits, alternatives to using testosterone supplementation, and patient would like to proceed with testing for testosterone and associated labs.    ROS is NEGATIVE for generalized pallor, dizziness, lightheadedness, blurred vision, chest pain/pressure, palpitations, tachycardia, dyspnea, hematemesis, hemoptysis, diarrhea, hematochezia, melena, hematuria, hand and foot tingling.    Patient verbalizes understanding that taking testosterone can increase risk of CV disease, CVA, prostate cancer, male pattern baldness, shrinkage of testicles, breast enlargement, increased risk of blood clots, increased mood swings and/or aggression, decreased sperm count, infertility, polycythemia.  Patient is willing to assume risks.

## 2019-04-26 NOTE — ASSESSMENT & PLAN NOTE
We discussed that patient is diagnosed with prediabetes, uncontrolled, new diagnosis given that the A1c is:   Lab Results   Component Value Date/Time    HBA1C 5.9 (H) 04/15/2019 06:06 AM        Patient is currently taking (no medication) for glycemic control.    We discussed that prediabetes/diabetes mellitus type 2 are medical conditions of lifestyle habits (less than optimal dietary choices, insufficient cardiovascular exercise), and that they can be controlled (in part or in whole) by these lifestyle changes.     Furthermore, we discussed that at present time it is better to pursue lifestyle changes rather than starting medications. Patient is in agreement.     ROS is NEGATIVE for blurred vision, polydipsia, polyuria, diaphoresis, palpitations, fatigue, irritability, flank pain, BLE paresthesias.

## 2019-04-26 NOTE — PROGRESS NOTES
Chief Complaint   Patient presents with   • Annual Exam   • Results         HPI:  Severino Alva Carrier is a 39 y.o. here for Medicare Annual Wellness Visit     Patient Active Problem List    Diagnosis Date Noted   • Prediabetes 04/26/2019   • Other fatigue 04/26/2019   • Finger lesion 02/13/2019   • Rupture of left Achilles tendon 06/24/2018   • Follicular disorder 06/21/2018   • Overweight (BMI 25.0-29.9) 05/11/2017       No current outpatient prescriptions on file.     No current facility-administered medications for this visit.             Current supplements as per medication list.       Allergies: Patient has no known allergies.    Current social contact/activities: ***     He  reports that he has never smoked. He has never used smokeless tobacco. He reports that he drinks about 4.2 oz of alcohol per week . He reports that he does not use drugs.  Counseling given: Not Answered      DPA/Advanced Directive:  {MA ADVANCED DIRECTIVE:57384}    ROS:    Gait: Uses {DEVICE:02293}  Ostomy: {AWV YES / NO:21192}  Other tubes: {AWV YES / NO:21192}  Amputations: {AWV YES / NO:21192}  Chronic oxygen use: {AWV YES / NO:21192}  Last eye exam: ***  Wears hearing aids: {AWV YES / NO:21192}   : {Urinary leakage?:20840}    Screening:  ***  Depression Screening    Little interest or pleasure in doing things?     Feeling down, depressed , or hopeless?    Trouble falling or staying asleep, or sleeping too much?     Feeling tired or having little energy?     Poor appetite or overeating?     Feeling bad about yourself - or that you are a failure or have let yourself or your family down?    Trouble concentrating on things, such as reading the newspaper or watching television?    Moving or speaking so slowly that other people could have noticed.  Or the opposite - being so fidgety or restless that you have been moving around a lot more than usual?     Thoughts that you would be better off dead, or of hurting yourself?     Patient  Health Questionnaire Score:      If depressive symptoms identified deferred to follow up visit unless specifically addressed in assessment and plan.    Interpretation of PHQ-9 Total Score   Score Severity   1-4 No Depression   5-9 Mild Depression   10-14 Moderate Depression   15-19 Moderately Severe Depression   20-27 Severe Depression      Screening for Cognitive Impairment    Three Minute Recall (village, kitchen, baby)  /3    Familia clock face with all 12 numbers and set the hands to show 10 past 10.       Cognitive concerns identified deferred for follow up unless specifically addressed in assessment and plan.    Fall Risk Assessment    Has the patient had two or more falls in the last year or any fall with injury in the last year?       Safety Assessment    Throw rugs on floor.     Handrails on all stairs.     Good lighting in all hallways.     Difficulty hearing.     Patient counseled about all safety risks that were identified.    Functional Assessment ADLs    Are there any barriers preventing you from cooking for yourself or meeting nutritional needs?   .    Are there any barriers preventing you from driving safely or obtaining transportation?   .    Are there any barriers preventing you from using a telephone or calling for help?   .    Are there any barriers preventing you from shopping?   .    Are there any barriers preventing you from taking care of your own finances?   .    Are there any barriers preventing you from managing your medications?   .    Are there any barriers preventing you from showering, bathing or dressing yourself?  .    Are you currently engaging in any exercise or physical activity?   .     What is your perception of your health?   .      Health Maintenance Summary                IMM DTaP/Tdap/Td Vaccine Overdue 4/2/1998     IMM INFLUENZA Next Due 9/1/2019           Patient Care Team:  Jasper Wise M.D. as PCP - General (Family Medicine)      Social History   Substance Use Topics  "  • Smoking status: Never Smoker   • Smokeless tobacco: Never Used   • Alcohol use 4.2 oz/week     7 Cans of beer per week     Family History   Problem Relation Age of Onset   • Heart Disease Mother         MI age 53   • Diabetes Mother    • GI Mother         Ulcers   • No Known Problems Sister    • No Known Problems Sister    • Cancer Neg Hx      He  has no past medical history on file.   Past Surgical History:   Procedure Laterality Date   • KNEE ARTHROSCOPY Left     Meniscus repair       Exam:   /80   Pulse 78   Temp 36.6 °C (97.9 °F)   Ht 1.88 m (6' 2.02\")   Wt 104.7 kg (230 lb 12.8 oz)   SpO2 97%  Body mass index is 29.62 kg/m².    Hearing {GOOD/FAIR/POOR/EXCELLENT:98550}.    Dentition {DENTITION:20013}  Alert, oriented in no acute distress.  Eye contact is good, speech goal directed, affect calm    Assessment and Plan. The following treatment and monitoring plan is recommended:  ***  1. Annual physical exam  TESTOSTERONE F&T MALE ADULT    TSH WITH REFLEX TO FT4    CBC WITH DIFFERENTIAL    PROSTATE SPECIFIC AG SCREENING   2. Prediabetes     3. Overweight (BMI 25.0-29.9)     4. Other fatigue  TESTOSTERONE F&T MALE ADULT    TSH WITH REFLEX TO FT4    CBC WITH DIFFERENTIAL   5. Rupture of left Achilles tendon, sequela     6. Prostate cancer screening  PROSTATE SPECIFIC AG SCREENING   7. Need for vaccination  Tdap Vaccine =>6YO IM         Services suggested: { AWV COORDINATION OF SERVICES:20405}  Health Care Screening: Age-appropriate preventive services recommended by USPTF and ACIP covered by Medicare were discussed today. Services ordered if indicated and agreed upon by the patient.  Referrals offered: Community-based lifestyle interventions to reduce health risks and promote self-management and wellness, fall prevention, nutrition, physical activity, tobacco-use cessation, weight loss, and mental health services as per orders if indicated.    Discussion today about general wellness and lifestyle " habits:    · Prevent falls and reduce trip hazards; Cautioned about securing or removing rugs.  · Have a working fire alarm and carbon monoxide detector;   · Engage in regular physical activity and social activities     Follow-up: Return in about 1 month (around 5/26/2019) for Fatigue/Testosterone follow-up.

## 2019-05-31 ENCOUNTER — APPOINTMENT (OUTPATIENT)
Dept: MEDICAL GROUP | Facility: MEDICAL CENTER | Age: 39
End: 2019-05-31
Payer: COMMERCIAL

## 2019-06-13 ENCOUNTER — OFFICE VISIT (OUTPATIENT)
Dept: MEDICAL GROUP | Facility: CLINIC | Age: 39
End: 2019-06-13
Payer: COMMERCIAL

## 2019-06-13 VITALS
OXYGEN SATURATION: 97 % | RESPIRATION RATE: 18 BRPM | DIASTOLIC BLOOD PRESSURE: 76 MMHG | BODY MASS INDEX: 29.52 KG/M2 | HEART RATE: 78 BPM | HEIGHT: 74 IN | SYSTOLIC BLOOD PRESSURE: 122 MMHG | WEIGHT: 230.06 LBS | TEMPERATURE: 98.8 F

## 2019-06-13 DIAGNOSIS — S86.012S RUPTURE OF LEFT ACHILLES TENDON, SEQUELA: ICD-10-CM

## 2019-06-13 DIAGNOSIS — M62.81 CALF MUSCLE WEAKNESS: ICD-10-CM

## 2019-06-13 PROCEDURE — 99213 OFFICE O/P EST LOW 20 MIN: CPT | Performed by: FAMILY MEDICINE

## 2019-06-13 NOTE — PROGRESS NOTES
"Subjective:      Severino Mcfarlane is a 38 y.o. male who presents with Follow-Up (follow up for Lt. foot)       Referred by Saundra Bustamante PA-C  for evaluation of LEFT ankle pain    HPI   FOLLOW up LEFT ankle achilles RUPTURE   Date of injury May 5, 2018  Mechanism of injury, heard a pop or playing basketball  Was able to bear weight shortly after the incident, limping    Never did PT as recommended  LEFT      Objective:     /76 (BP Location: Right arm, Patient Position: Sitting, BP Cuff Size: Adult)   Pulse 78   Temp 37.1 °C (98.8 °F) (Temporal)   Resp 18   Ht 1.88 m (6' 2.02\")   Wt 104.4 kg (230 lb 1 oz)   SpO2 97%   BMI 29.52 kg/m²     Physical Exam    LEFT ANKLE:  There is NO swelling   Excellent scar tissue formation around the Achilles rupture region  Good strength with active resisted dorsiflexion  Finney test NEGATIVE  LEFT calf atrophy    NEUTRAL stance  NORMAL gait    Assessment/Plan:     1. Rupture of left Achilles tendon, sequela  REFERRAL TO PHYSICAL THERAPY Reason for Therapy: Eval/Treat/Report   2. Calf muscle weakness  REFERRAL TO PHYSICAL THERAPY Reason for Therapy: Eval/Treat/Report     Clinical diagnosis of acute LEFT Achilles tendon rupture while playing basketball  Healing    Patient has been in a cam walker boot for a total of 8 weeks with gradual decline in heel wedge height    Recommend weaning boot this week, he can stop wearing the boot while at home, walking with caution    Follow up after 4 weeks of PT  To see how he is doing with formal physical therapy    Thank you Saundra Bustamante PA-C for allowing me to participate in caring for your patient.  "

## 2022-09-27 ENCOUNTER — OFFICE VISIT (OUTPATIENT)
Dept: URGENT CARE | Facility: CLINIC | Age: 42
End: 2022-09-27
Payer: COMMERCIAL

## 2022-09-27 VITALS
HEART RATE: 86 BPM | RESPIRATION RATE: 16 BRPM | WEIGHT: 230 LBS | TEMPERATURE: 96.5 F | OXYGEN SATURATION: 99 % | SYSTOLIC BLOOD PRESSURE: 130 MMHG | HEIGHT: 75 IN | DIASTOLIC BLOOD PRESSURE: 82 MMHG | BODY MASS INDEX: 28.6 KG/M2

## 2022-09-27 DIAGNOSIS — S46.911A STRAIN OF RIGHT UPPER ARM, INITIAL ENCOUNTER: ICD-10-CM

## 2022-09-27 PROCEDURE — 99203 OFFICE O/P NEW LOW 30 MIN: CPT | Performed by: NURSE PRACTITIONER

## 2022-09-27 NOTE — PROGRESS NOTES
Chief Complaint   Patient presents with    Arm Injury     (R) bicep, heard a pop and felt pain while bowling , not improving much       HISTORY OF PRESENT ILLNESS: Patient is a pleasant 42 y.o. male who presents to urgent care today with complaints of right arm pain.  Patient notes he was bowling on 2022.  As he was moving his arm forward he felt a pull to his bicep and elbow region.  He has had pain to the area since.  Denies any numbness, tingling, weakness.  He has tried ibuprofen sparingly for pain relief.  Denies previous injuries to this arm.  He is right-hand dominant.    Patient Active Problem List    Diagnosis Date Noted    Prediabetes 2019    Other fatigue 2019    Finger lesion 2019    Rupture of left Achilles tendon 2018    Follicular disorder 2018    Overweight (BMI 25.0-29.9) 2017       Allergies:Patient has no known allergies.    No current [facility]-ordered outpatient medications on file.     No current [facility]-ordered facility-administered medications on file.       History reviewed. No pertinent past medical history.    Social History     Tobacco Use    Smoking status: Never    Smokeless tobacco: Never   Substance Use Topics    Alcohol use: Yes     Alcohol/week: 4.2 oz     Types: 7 Cans of beer per week    Drug use: No       Family Status   Relation Name Status    Mo          MI    Sis 1/2 from mom,older Alive    Fa not in contact (Not Specified)    MGMo      MGFa  Alive    Sis 1/2 from mom,younger Alive    Neg Hx  (Not Specified)     Family History   Problem Relation Age of Onset    Heart Disease Mother         MI age 53    Diabetes Mother     GI Disease Mother         Ulcers    No Known Problems Sister     No Known Problems Sister     Cancer Neg Hx        ROS:  Review of Systems   Constitutional: Negative for fever, chills, weight loss, malaise, and fatigue.   HENT: Negative for ear pain, nosebleeds, congestion, sore throat and neck pain.   "  Eyes: Negative for vision changes.   Neuro: Negative for headache, sensory changes, weakness, seizure, LOC.   Cardiovascular: Negative for chest pain, palpitations, orthopnea and leg swelling.   Respiratory: Negative for cough, sputum production, shortness of breath and wheezing.   Gastrointestinal: Negative for abdominal pain, nausea, vomiting or diarrhea.   Genitourinary: Negative for dysuria, urgency and frequency.  Musculoskeletal: Positive for right arm pain.  Negative for falls, neck pain, back pain, myalgias.   Skin: Negative for rash, diaphoresis.     Exam:  /82   Pulse 86   Temp 35.8 °C (96.5 °F) (Temporal)   Resp 16   Ht 1.905 m (6' 3\")   Wt 104 kg (230 lb)   SpO2 99%   General: well-nourished, well-developed male in NAD  Head: normocephalic, atraumatic  Eyes: PERRLA, no conjunctival injection, acuity grossly intact, lids normal.  Ears: normal shape and symmetry, no tenderness, no discharge. External canals are without any significant edema or erythema. Tympanic membranes are without any inflammation, no effusion. Gross auditory acuity is intact.  Nose: symmetrical without tenderness, no discharge.  Mouth/Throat: reasonable hygiene, no erythema, exudates or tonsillar enlargement.  Neck: no masses, range of motion within normal limits, no tracheal deviation. No obvious thyroid enlargement.   Lymph: no cervical adenopathy. No supraclavicular adenopathy.   Neuro: alert and oriented. Cranial nerves 1-12 grossly intact. No sensory deficit.   Cardiovascular: regular rate and rhythm. No edema.  Pulmonary: no distress. Chest is symmetrical with respiration, no wheezes, crackles, or rhonchi.   Abdomen: soft, non-tender, no guarding, no hepatosplenomegaly  Musculoskeletal: no clubbing, appropriate muscle tone, gait is stable.  Right arm: Elbow is normal appearance, no bony tenderness, full range of motion.  Pain elicited with flexion, able to fully extend without difficulty.  Neurovascular intact, cap " refill is brisk.  Strength 5/5.  There is some soft tissue tenderness noted to bicep insertion as well as medial and bilateral aspects of elbow.  Skin: warm, dry, intact, no clubbing, no cyanosis, no rashes.   Psych: appropriate mood, affect, judgement.         Assessment/Plan:  1. Strain of right upper arm, initial encounter  Referral to Orthopedics          Suspect strain of right upper arm, OTC NSAIDs encouraged, rest, referral to orthopedics placed for follow-up.  Supportive care, differential diagnoses, and indications for immediate follow-up discussed with patient.   Pathogenesis of diagnosis discussed including typical length and natural progression.   Instructed to return to clinic or nearest emergency department for any change in condition, further concerns, or worsening of symptoms.  Patient states understanding of the plan of care and discharge instructions.  Instructed to make an appointment, for follow up, with his primary care provider.        Please note that this dictation was created using voice recognition software. I have made every reasonable attempt to correct obvious errors, but I expect that there are errors of grammar and possibly content that I did not discover before finalizing the note.      EMBER Lazcano.